# Patient Record
Sex: FEMALE | Race: WHITE | Employment: OTHER | URBAN - METROPOLITAN AREA
[De-identification: names, ages, dates, MRNs, and addresses within clinical notes are randomized per-mention and may not be internally consistent; named-entity substitution may affect disease eponyms.]

---

## 2019-01-24 ENCOUNTER — HOSPITAL ENCOUNTER (OUTPATIENT)
Dept: MAMMOGRAPHY | Age: 69
Discharge: HOME | End: 2019-01-24
Attending: INTERNAL MEDICINE
Payer: MEDICARE

## 2019-01-24 ENCOUNTER — HOSPITAL ENCOUNTER (OUTPATIENT)
Dept: GENERAL RADIOLOGY | Age: 69
Discharge: HOME | End: 2019-01-24
Attending: INTERNAL MEDICINE
Payer: MEDICARE

## 2019-01-24 DIAGNOSIS — M81.0 OSTEOPOROSIS, UNSPECIFIED OSTEOPOROSIS TYPE, UNSPECIFIED PATHOLOGICAL FRACTURE PRESENCE: ICD-10-CM

## 2019-01-24 DIAGNOSIS — M06.00 SERONEGATIVE RHEUMATOID ARTHRITIS (HCC): ICD-10-CM

## 2019-01-24 DIAGNOSIS — R53.83 FATIGUE, UNSPECIFIED TYPE: ICD-10-CM

## 2019-01-24 DIAGNOSIS — Z79.52 LONG TERM SYSTEMIC STEROID USER: ICD-10-CM

## 2019-01-24 DIAGNOSIS — Z79.83 LONG-TERM CURRENT USE OF BISPHOSPHONATE: ICD-10-CM

## 2019-01-24 DIAGNOSIS — Z79.899 LONG TERM CURRENT USE OF IMMUNOSUPPRESSIVE DRUG: ICD-10-CM

## 2019-01-24 DIAGNOSIS — E55.9 HYPOVITAMINOSIS D: ICD-10-CM

## 2019-01-24 DIAGNOSIS — Z79.899 HIGH RISK MEDICATION USE: ICD-10-CM

## 2019-01-24 PROCEDURE — 77080 DXA BONE DENSITY AXIAL: CPT

## 2019-01-24 PROCEDURE — 73130 X-RAY EXAM OF HAND: CPT

## 2019-04-27 ENCOUNTER — HOSPITAL ENCOUNTER (OUTPATIENT)
Dept: MAMMOGRAPHY | Age: 69
Discharge: HOME | End: 2019-04-27
Attending: INTERNAL MEDICINE
Payer: MEDICARE

## 2019-04-27 DIAGNOSIS — Z12.31 ENCOUNTER FOR SCREENING MAMMOGRAM FOR MALIGNANT NEOPLASM OF BREAST: ICD-10-CM

## 2019-04-27 PROCEDURE — 77063 BREAST TOMOSYNTHESIS BI: CPT

## 2021-03-25 ENCOUNTER — TRANSCRIBE ORDER (OUTPATIENT)
Dept: SCHEDULING | Age: 71
End: 2021-03-25

## 2021-03-25 DIAGNOSIS — M81.8 OTHER OSTEOPOROSIS WITHOUT CURRENT PATHOLOGICAL FRACTURE: Primary | ICD-10-CM

## 2021-03-25 DIAGNOSIS — Z12.31 ENCOUNTER FOR SCREENING MAMMOGRAM FOR MALIGNANT NEOPLASM OF BREAST: Primary | ICD-10-CM

## 2021-05-06 ENCOUNTER — HOSPITAL ENCOUNTER (OUTPATIENT)
Dept: MAMMOGRAPHY | Age: 71
Discharge: HOME | End: 2021-05-06
Attending: NURSE PRACTITIONER
Payer: MEDICARE

## 2021-05-06 DIAGNOSIS — Z12.31 ENCOUNTER FOR SCREENING MAMMOGRAM FOR MALIGNANT NEOPLASM OF BREAST: ICD-10-CM

## 2021-05-06 DIAGNOSIS — M81.8 OTHER OSTEOPOROSIS WITHOUT CURRENT PATHOLOGICAL FRACTURE: ICD-10-CM

## 2021-05-06 PROCEDURE — 77080 DXA BONE DENSITY AXIAL: CPT

## 2021-05-06 PROCEDURE — 77067 SCR MAMMO BI INCL CAD: CPT

## 2021-05-07 ENCOUNTER — TRANSCRIBE ORDER (OUTPATIENT)
Dept: SCHEDULING | Age: 71
End: 2021-05-07

## 2021-05-07 DIAGNOSIS — R92.8 ABNORMAL MAMMOGRAM OF RIGHT BREAST: Primary | ICD-10-CM

## 2021-05-12 ENCOUNTER — HOSPITAL ENCOUNTER (OUTPATIENT)
Dept: MAMMOGRAPHY | Age: 71
Discharge: HOME | End: 2021-05-12
Attending: INTERNAL MEDICINE
Payer: MEDICARE

## 2021-05-12 ENCOUNTER — HOSPITAL ENCOUNTER (OUTPATIENT)
Dept: MAMMOGRAPHY | Age: 71
End: 2021-05-12
Attending: INTERNAL MEDICINE
Payer: MEDICARE

## 2021-05-12 DIAGNOSIS — R92.8 ABNORMAL MAMMOGRAM OF RIGHT BREAST: ICD-10-CM

## 2021-05-12 PROCEDURE — 77065 DX MAMMO INCL CAD UNI: CPT

## 2021-10-20 ENCOUNTER — TRANSCRIBE ORDER (OUTPATIENT)
Dept: SCHEDULING | Age: 71
End: 2021-10-20

## 2021-10-20 DIAGNOSIS — R92.8 ABNORMALITY OF RIGHT BREAST ON SCREENING MAMMOGRAM: Primary | ICD-10-CM

## 2021-11-04 ENCOUNTER — HOSPITAL ENCOUNTER (OUTPATIENT)
Dept: MAMMOGRAPHY | Age: 71
Discharge: HOME | End: 2021-11-04
Attending: INTERNAL MEDICINE
Payer: MEDICARE

## 2021-11-04 DIAGNOSIS — R92.8 ABNORMAL MAMMOGRAM OF RIGHT BREAST: ICD-10-CM

## 2021-11-04 DIAGNOSIS — R92.8 ABNORMALITY OF RIGHT BREAST ON SCREENING MAMMOGRAM: ICD-10-CM

## 2021-11-04 PROCEDURE — 77065 DX MAMMO INCL CAD UNI: CPT

## 2022-02-21 ENCOUNTER — TRANSCRIBE ORDER (OUTPATIENT)
Dept: SCHEDULING | Age: 72
End: 2022-02-21

## 2022-02-21 DIAGNOSIS — Z12.31 SCREENING MAMMOGRAM, ENCOUNTER FOR: Primary | ICD-10-CM

## 2022-04-28 ENCOUNTER — TRANSCRIBE ORDER (OUTPATIENT)
Dept: SCHEDULING | Age: 72
End: 2022-04-28

## 2022-04-28 DIAGNOSIS — R92.8 ABNORMAL MAMMOGRAM OF RIGHT BREAST: Primary | ICD-10-CM

## 2022-05-11 ENCOUNTER — HOSPITAL ENCOUNTER (OUTPATIENT)
Dept: MAMMOGRAPHY | Age: 72
Discharge: HOME | End: 2022-05-11
Attending: INTERNAL MEDICINE
Payer: MEDICARE

## 2022-05-11 ENCOUNTER — HOSPITAL ENCOUNTER (OUTPATIENT)
Dept: MAMMOGRAPHY | Age: 72
Discharge: HOME | End: 2022-05-11
Payer: MEDICARE

## 2022-05-11 DIAGNOSIS — R92.8 ABNORMAL MAMMOGRAM OF RIGHT BREAST: ICD-10-CM

## 2022-05-11 PROCEDURE — 77066 DX MAMMO INCL CAD BI: CPT

## 2022-05-24 ENCOUNTER — OFFICE VISIT (OUTPATIENT)
Dept: RHEUMATOLOGY | Age: 72
End: 2022-05-24
Payer: MEDICARE

## 2022-05-24 VITALS
HEART RATE: 98 BPM | SYSTOLIC BLOOD PRESSURE: 125 MMHG | OXYGEN SATURATION: 95 % | WEIGHT: 124.8 LBS | DIASTOLIC BLOOD PRESSURE: 73 MMHG

## 2022-05-24 VITALS
HEART RATE: 95 BPM | SYSTOLIC BLOOD PRESSURE: 132 MMHG | WEIGHT: 124 LBS | DIASTOLIC BLOOD PRESSURE: 77 MMHG | TEMPERATURE: 97.8 F

## 2022-05-24 DIAGNOSIS — Z79.899 LONG TERM CURRENT USE OF IMMUNOSUPPRESSIVE DRUG: ICD-10-CM

## 2022-05-24 DIAGNOSIS — M06.09 RHEUMATOID ARTHRITIS OF MULTIPLE SITES WITHOUT RHEUMATOID FACTOR (HCC): Primary | ICD-10-CM

## 2022-05-24 DIAGNOSIS — M35.01 SJOGREN'S SYNDROME WITH KERATOCONJUNCTIVITIS SICCA (HCC): ICD-10-CM

## 2022-05-24 DIAGNOSIS — Z79.52 LONG TERM (CURRENT) USE OF SYSTEMIC STEROIDS: ICD-10-CM

## 2022-05-24 DIAGNOSIS — M15.9 GENERALIZED OSTEOARTHRITIS: ICD-10-CM

## 2022-05-24 DIAGNOSIS — M81.0 POSTMENOPAUSAL OSTEOPOROSIS: ICD-10-CM

## 2022-05-24 DIAGNOSIS — E55.9 HYPOVITAMINOSIS D: ICD-10-CM

## 2022-05-24 DIAGNOSIS — Z79.899 HIGH RISK MEDICATION USE: ICD-10-CM

## 2022-05-24 DIAGNOSIS — Z79.899 ENCOUNTER FOR LONG-TERM (CURRENT) USE OF HIGH-RISK MEDICATION: ICD-10-CM

## 2022-05-24 DIAGNOSIS — Z79.52 LONG TERM CURRENT USE OF SYSTEMIC STEROIDS: ICD-10-CM

## 2022-05-24 DIAGNOSIS — Z51.81 ENCOUNTER FOR THERAPEUTIC DRUG MONITORING: ICD-10-CM

## 2022-05-24 DIAGNOSIS — R53.82 CHRONIC FATIGUE: ICD-10-CM

## 2022-05-24 PROCEDURE — 3017F COLORECTAL CA SCREEN DOC REV: CPT | Performed by: INTERNAL MEDICINE

## 2022-05-24 PROCEDURE — 1090F PRES/ABSN URINE INCON ASSESS: CPT | Performed by: INTERNAL MEDICINE

## 2022-05-24 PROCEDURE — 1123F ACP DISCUSS/DSCN MKR DOCD: CPT | Performed by: INTERNAL MEDICINE

## 2022-05-24 PROCEDURE — G8399 PT W/DXA RESULTS DOCUMENT: HCPCS | Performed by: INTERNAL MEDICINE

## 2022-05-24 PROCEDURE — 96372 THER/PROPH/DIAG INJ SC/IM: CPT | Performed by: INTERNAL MEDICINE

## 2022-05-24 PROCEDURE — 20600 DRAIN/INJ JOINT/BURSA W/O US: CPT | Performed by: INTERNAL MEDICINE

## 2022-05-24 PROCEDURE — G8421 BMI NOT CALCULATED: HCPCS | Performed by: INTERNAL MEDICINE

## 2022-05-24 PROCEDURE — G8427 DOCREV CUR MEDS BY ELIG CLIN: HCPCS | Performed by: INTERNAL MEDICINE

## 2022-05-24 PROCEDURE — 1036F TOBACCO NON-USER: CPT | Performed by: INTERNAL MEDICINE

## 2022-05-24 PROCEDURE — 99215 OFFICE O/P EST HI 40 MIN: CPT | Performed by: INTERNAL MEDICINE

## 2022-05-24 RX ORDER — NITROGLYCERIN 0.4 MG/1
0.4 TABLET SUBLINGUAL EVERY 5 MIN PRN
COMMUNITY
Start: 2019-12-20

## 2022-05-24 RX ORDER — LEVOTHYROXINE SODIUM 88 UG/1
TABLET ORAL
COMMUNITY
Start: 2022-05-02

## 2022-05-24 RX ORDER — CERTOLIZUMAB PEGOL 200 MG/ML
200 INJECTION, SOLUTION SUBCUTANEOUS
COMMUNITY

## 2022-05-24 RX ADMIN — METHYLPREDNISOLONE ACETATE 80 MG: 80 INJECTION, SUSPENSION INTRA-ARTICULAR; INTRALESIONAL; INTRAMUSCULAR; SOFT TISSUE at 10:49

## 2022-05-24 ASSESSMENT — PATIENT HEALTH QUESTIONNAIRE - PHQ9
2. FEELING DOWN, DEPRESSED OR HOPELESS: 0
SUM OF ALL RESPONSES TO PHQ QUESTIONS 1-9: 5
6. FEELING BAD ABOUT YOURSELF - OR THAT YOU ARE A FAILURE OR HAVE LET YOURSELF OR YOUR FAMILY DOWN: 0
7. TROUBLE CONCENTRATING ON THINGS, SUCH AS READING THE NEWSPAPER OR WATCHING TELEVISION: 0
10. IF YOU CHECKED OFF ANY PROBLEMS, HOW DIFFICULT HAVE THESE PROBLEMS MADE IT FOR YOU TO DO YOUR WORK, TAKE CARE OF THINGS AT HOME, OR GET ALONG WITH OTHER PEOPLE: 1
4. FEELING TIRED OR HAVING LITTLE ENERGY: 3
1. LITTLE INTEREST OR PLEASURE IN DOING THINGS: 1
8. MOVING OR SPEAKING SO SLOWLY THAT OTHER PEOPLE COULD HAVE NOTICED. OR THE OPPOSITE, BEING SO FIGETY OR RESTLESS THAT YOU HAVE BEEN MOVING AROUND A LOT MORE THAN USUAL: 0
3. TROUBLE FALLING OR STAYING ASLEEP: 1
5. POOR APPETITE OR OVEREATING: 0
9. THOUGHTS THAT YOU WOULD BE BETTER OFF DEAD, OR OF HURTING YOURSELF: 0
SUM OF ALL RESPONSES TO PHQ9 QUESTIONS 1 & 2: 1
SUM OF ALL RESPONSES TO PHQ QUESTIONS 1-9: 5

## 2022-05-24 NOTE — PROGRESS NOTES
[address] [address], [address]  Office : [phone], Fax: [phone]         Cimzia injections today. 400  mg lyophilized powder given SC. Each 200 mg vial mixed with 1 ml sterile water each and allowed 30 minutes to reconstitute. Injections given in right and left lower abdomen without difficulty. Last injections were given 4/25//2022  Next Injections scheduled for 6/21/2022    Pre-Infusion Questionnaire  1. Has your insurance changed or have you received a new card since your last visit? No  2. Have you had an infection since your last infusion? No  3. Have you recently had GI problems, open wounds, urinary problems, or chest pain? No  4. Are you currently taking antibiotics? 5. No  6. Have you recently had or are you scheduled for any surgical procedures? No  7. Have you had a TB test in the last year? Yes  8. Did you take an antihistamine today? No  9. Have you received any vaccinations recently? Yes  10. When was your last appointment with one of our providers? Today 5/24/2022  11. When was your last injection? 4/25/2022  12. Are you in a skilled nursing facility?       No    Reviewed and Confirmed by Hiram Rodriguez

## 2022-05-24 NOTE — PATIENT INSTRUCTIONS
1. Labs - cbc, cmp, vit d before next visit    2. Prednisone  5mg daily   as needed   3. Vit d 35727 units  once weekly for now   4.  prolia - every 6 months   5. Gabapentin 300mg once daily at 6pm  6.  cimzia - every 4 weeks shot - continue   7.  R wrist Injected - try not to overuse for next couple of days  8.  RTC in  4 months   - call if any issues

## 2022-05-24 NOTE — PROGRESS NOTES
Subjective:      HPI:        Permanent history    Mrs. Jean Claude Garland is a very pleasant 67year-old  lady with past medical history significant for seronegative rheumatoid arthritis, osteoporosis, tenosynovitis of the wrist, osteoarthritis, hypothyroidism, hyperlipidemia, fibroids who presents for evaluation of seronegative RA. Patient previously seen by rheumatology lost to follow-up I have reviewed these records. In reviewing records it appears patient has had long-standing seronegative rheumatoid arthritis, for which she has been treated with methotrexate, Plaquenil, failed Rituxan, Symphony Olena Share, Actemra and most recently on Cimzia injections. Patient reports Cimzia injections were helping a however due to shingles outbreak was discontinued and was lost to follow-up. Reports pretty much only using for the past year as needed pain meds and NSAIDs for pain flareups. However the past few weeks patient has had increased pain and swelling in the right wrist, seen by PCP, treated with short-term prednisone during which symptoms improve however upon tapering symptoms return. Still persistent today. Symptoms generally worse first thing in the morning with significant stiffness up to an hour a day. Generally improved with movement with rest - Having to wear a brace on a daily basis. In terms of her osteoporosis previously treated with Actonel and has completed 5 years of Reclast last infusion being 2016 may. Since last visit    Not taking any prednisone. Pt has IBS, hard to take Pred or Mtx. Taking vit d 50,000 weekly, Wilmer 300 mg- not taking anymore, Cimzia every 4 weeks. .  Rash on Lt arm- cleared - dx as atopic dermatitis Prolia 1/26/22. BMD 5/6/21. Worst joint thumbs, right foot. ROS:     Musculoskeletal ROS:   .    Abnormal:  joint pain, joint swelling  . AM stiffness (hours): 120 + min  . Pain scale (0-10): 6  .     Fatigue scale (0-10): 8  .    Job status:  not working  . Activities of daily living: much difficulty    positive as above with the addition of   Dry eyes, upset stomach, red eyes, difficulty sleeping, diarrhea  Otherwise negative for:  Fevers, chills or sweats. Weight  stable  No headaches or scalp tenderness. No jaw claudication. No acute visual changes. No auditory complaints. No nasal discharge or rhinorrhea or dry mouth. No oral lesions or ulcers. No sore throat. No tongue pain. No cough. No shortness of breath, dyspnea on exertion or wheezing. No hemoptysis. No chest pains. No palpitations. No lightheadedness. No pedal edema. No GERD symptoms, abdominal pain, or constipation. No increased urinary frequency, nocturia, dysuria or changes in urine color. No alopecia or skin rashes/lesions. No changes in skin tightness. No Raynaud's. Photosensitivity. Current Outpatient Medications:     predniSONE (DELTASONE) 5 mg tablet, Take 15mg daily x3 then 10mg daily x 3 then 5mg daily x 3 then  as needed, Disp: 90 Tablet, Rfl: 0    gabapentin (NEURONTIN) 300 mg capsule, Take 1 Capsule by mouth daily (with dinner). , Disp: 90 Capsule, Rfl: 0    ergocalciferol (ERGOCALCIFEROL) 1,250 mcg (50,000 unit) capsule, Take 1 Capsule by mouth every seven (7) days. , Disp: 12 Capsule, Rfl: 0    cholecalciferol (VITAMIN D3) (2,000 UNITS /50 MCG) cap capsule, Take  by mouth daily. , Disp: , Rfl:     alendronate (FOSAMAX) 70 mg tablet, Take  by mouth every seven (7) days. , Disp: , Rfl:     hydrOXYchloroQUINE (PlaqueniL) 200 mg tablet, Take 1 Tablet by mouth daily. , Disp: 90 Tablet, Rfl: 0    rosuvastatin (CRESTOR) 10 mg tablet, Take 10 mg by mouth daily. , Disp: , Rfl:     aspirin delayed-release 81 mg tablet, Take  by mouth daily. , Disp: , Rfl:     predniSONE (DELTASONE) 5 mg tablet, Take 1 Tab by mouth daily as needed for Pain.  (Patient not taking: Reported on 10/6/2021), Disp: 90 Tab, Rfl: 0    pen needle, diabetic (COMFORT EZ PEN NEEDLES) 32 gauge x 3/16\" ndle, For use daily with the Forteo pen device, Disp: 100 Each, Rfl: 1    levothyroxine (SYNTHROID) 50 mcg tablet, Take  by mouth Daily (before breakfast). , Disp: , Rfl:     sertraline (ZOLOFT) 100 mg tablet, Take 1 Tab by mouth daily. (Patient taking differently: Take 125 mg by mouth daily.), Disp: 90 Tab, Rfl: 1    CALCIUM CARBONATE/VITAMIN D3 (CALCIUM 600 + D,3, PO), Take 1 Tab by mouth daily. Ca 600 D 100, Disp: , Rfl:     CALCIUM CARB-MAG OXIDE-ZINC OX PO, Take 1 Tab by mouth daily. Ca 333, Disp: , Rfl:     multivitamin (ONE A DAY) tablet, Take 1 Tab by mouth daily. Ca 500, D 1000, Disp: , Rfl:     folic acid 075 mcg tablet, Take 800 mcg by mouth daily. , Disp: , Rfl:     Allergies   Allergen Reactions    Egg Itching and Other (comments)     Throat swelling    Erythromycin Rash    Pcn [Penicillins] Rash    Sulfa (Sulfonamide Antibiotics) Swelling     Throat swells       Patient Active Problem List   Diagnosis Code    Fibroids D21.9    Depression F32. A    Mouth dryness R68.2    Eye dryness H04.129    Polyarthritis M13.0    Other tenosynovitis of wrist flexor M65.839    Fatigue R53.83    Hypothyroidism E03.9    Carpal tunnel syndrome G56.00    Hyperlipidemia E78.5    Hand tendonitis M77.8    Sjogren's syndrome (HCC) M35.00    Osteoporosis, post-menopausal M81.0    Trigger middle finger of left hand M65.332    Leukopenia D72.819    Edema R60.9    Primary osteoarthritis of both hands M19.041, M19.042    Rheumatoid arthritis of multiple sites without rheumatoid factor (HCC) M06.09    Chronic coughing R05.3       Past Medical History:   Diagnosis Date    Carpal tunnel syndrome 8/15/2013    Depression 8/15/2013    Eye dryness 8/15/2013    Fatigue 8/15/2013    Fibroids 8/15/2013    BILATERAL BREAST     Fibromyalgia 8/15/2013    Hyperlipidemia 8/15/2013    Hypothyroidism 8/15/2013    Menopause     Mouth dryness 8/15/2013    Osteoarthritis 8/15/2013    Other tenosynovitis of wrist flexor 8/15/2013    Polyarthritis 8/15/2013    RA (rheumatoid arthritis) ([facility] [address] 8/15/2013       Past Surgical History:   Procedure Laterality Date    HX BREAST AUGMENTATION      HX  SECTION      HX HYSTERECTOMY      ONE OVARY REMOVED    HX MASTECTOMY Bilateral     FIBROCYSTIC MASSES REMOVED, HAS TISSUE    HX ORTHOPAEDIC  1995    TENDON RELEASE AT RIGHT ELBOW       Family History   Problem Relation Age of Onset    Alzheimer's Disease Mother     Coronary Art Dis Mother     Bipolar Disorder Mother     Other Father         ANEURYSM    Heart Disease Father     Hypertension Father     Arthritis-rheumatoid Maternal Aunt        Social History     Socioeconomic History    Marital status:    Tobacco Use    Smoking status: Never Smoker    Smokeless tobacco: Never Used   Substance and Sexual Activity    Alcohol use: No         Objective:         Vitals:    22 1123   BP: 125/73   Pulse: 98   SpO2: 95%          PHYSICAL EXAMINATION:     General: alert, healthy, well nourished, pleasant, no acute distress   Lungs: clear to auscultation bilaterally without wheezes,    Heart: regular rate & rhythm, +S1, +S2, no murmurs   Extremities: no clubbing, cyanosis, or edema    MUSCULOSKELETAL:   -Hands: Diffuse fullness chronic thickening bilateral MCP second third fourth, no acute synovitis    -Wrists: Right wrist  tender to palpation decreased range of motion due to pain  - leeroy [facility]   -Elbows: normal   -Shoulders: normal   -Neck: Slow but full range of motion due to pain  -Back: Point tenderness of the low back  -Hips: normal   -Knees: Bilateral crepitus  -Ankles: normal   -Feet: normal    Swollen Joint Count:1  Tender Joint Count: 2 -3         Procedure:   Patient identified, procedure verified, site verified. Risks/benefits discussed. Final verification of procedure. Timeout performed. Application of topical anesthetic and sterile preparation.    injection site: right wristinjected with depomedrol 80 mg and 0.5ml of 1% lidocaine. Patient tolerated well, no complications. Assessment:       Mrs. Jean Claude Garland is a very pleasant 27-year-old  lady with past medical history significant for seronegative rheumatoid arthritis, osteoporosis, tenosynovitis of the wrist, osteoarthritis, hypothyroidism, hyperlipidemia, fibroids who presents for fu of seronegative RA. Extensive review of records from old rheumatology please see summary above, I do not have patient's PCP records available at this time will obtain. Recent xrays which I have reviewed myself show joint space narrowing and some new early erosive changes     Clinical picture consistent with long-standing seronegative rheumatoid arthritis previously failed methotrexate, Plaquenil, rituximab, Simphoni Aria, Gerold Neer, Actemra and most recently on Cimzia which although effective is held due to recurrent shingles outbreak -was on hold with immunosuppressives except low dose plaquenil subsequently flairing significantly and finally able to get shingrex vaccines and resume Cimzia last visit, has been getting monthly injections tolerating well no flaring of shingles. Labs stable -ophthalmology recommend discontinuing Plaquenil  stopped 2021. Flaring of right-sided wrist pain more CMC OA was injected. Osteoporosis -previously treated with Actonel and 5 years of Reclast last dose 2016 May. Repeat dexa scan from 2021 -    FRAX calculation (using patients risk factors ) of 10 yr risk of major osteoporotic and hip fracture is 30% and 9 % (from 48  and 18 % respectively - after completing 2 yrs of forteo) and Tx warranted if >20% and 3 % respectively - change to prolia - every 6 months - tolerating . Radiculopathy -on gabapentin as below. Total visit time  45 minutes , greater than half of the time was spent on counseling. Plan:       1. Labs - cbc, cmp, vit d before next visit    2. Prednisone  5mg daily   as needed   3.  Vit d 00817 units  once weekly for now   4.  prolia - every 6 months   5. Gabapentin 300mg once daily at 6pm  6.  cimzia - every 4 weeks shot - continue   7.  R wrist Injected - try not to overuse for next couple of days  8.  RTC in  4 months   - call if any issues

## 2022-05-25 RX ORDER — PREDNISONE 1 MG/1
5 TABLET ORAL DAILY
Qty: 90 TABLET | Refills: 1 | Status: SHIPPED | OUTPATIENT
Start: 2022-05-25 | End: 2022-09-13

## 2022-05-25 RX ORDER — METHYLPREDNISOLONE ACETATE 80 MG/ML
80 INJECTION, SUSPENSION INTRA-ARTICULAR; INTRALESIONAL; INTRAMUSCULAR; SOFT TISSUE ONCE
Status: COMPLETED | OUTPATIENT
Start: 2022-05-25 | End: 2022-05-24

## 2022-05-25 RX ORDER — GABAPENTIN 300 MG/1
300 CAPSULE ORAL
Qty: 90 CAPSULE | Refills: 1 | Status: SHIPPED | OUTPATIENT
Start: 2022-05-25 | End: 2022-10-11

## 2022-05-25 RX ORDER — ERGOCALCIFEROL (VITAMIN D2) 1250 MCG
50000 CAPSULE ORAL
Qty: 12 CAPSULE | Refills: 1 | Status: SHIPPED | OUTPATIENT
Start: 2022-05-25 | End: 2022-09-13 | Stop reason: SDUPTHER

## 2022-06-21 ENCOUNTER — NURSE ONLY (OUTPATIENT)
Dept: RHEUMATOLOGY | Age: 72
End: 2022-06-21

## 2022-06-21 VITALS
SYSTOLIC BLOOD PRESSURE: 122 MMHG | HEART RATE: 97 BPM | WEIGHT: 124 LBS | DIASTOLIC BLOOD PRESSURE: 73 MMHG | TEMPERATURE: 97.8 F

## 2022-06-21 DIAGNOSIS — M06.09 RHEUMATOID ARTHRITIS OF MULTIPLE SITES WITHOUT RHEUMATOID FACTOR (HCC): Primary | ICD-10-CM

## 2022-06-21 NOTE — PROGRESS NOTES
[address] [address], [address]  Office : [phone], Fax: [phone]         Cimzia injections today. 400  mg lyophilized powder given SC. Each 200 mg vial mixed with 1 ml sterile water each and allowed 30 minutes to reconstitute. Injections given in right and left abd without difficulty. Last injections were given 5/24/2022  Next Injections scheduled for 7/19/2022    Pre-Infusion Questionnaire  1. Has your insurance changed or have you received a new card since your last visit? No  2. Have you had an infection since your last infusion? No  3. Have you recently had GI problems, open wounds, urinary problems, or chest pain? No  4. Are you currently taking antibiotics? 5. No  6. Have you recently had or are you scheduled for any surgical procedures? No  7. Have you had a TB test in the last year? Yes  8. Did you take an antihistamine today? No  9. Have you received any vaccinations recently? Yes  10. When was your last appointment with one of our providers? 5/24/2022  11. When was your last injection? 5/24/2022  12. Are you in a skilled nursing facility?       No    Reviewed and Confirmed by Ludy Church

## 2022-07-19 ENCOUNTER — NURSE ONLY (OUTPATIENT)
Dept: RHEUMATOLOGY | Age: 72
End: 2022-07-19
Payer: MEDICARE

## 2022-07-19 VITALS — SYSTOLIC BLOOD PRESSURE: 125 MMHG | TEMPERATURE: 98.2 F | DIASTOLIC BLOOD PRESSURE: 69 MMHG | HEART RATE: 90 BPM

## 2022-07-19 DIAGNOSIS — M06.09 RHEUMATOID ARTHRITIS OF MULTIPLE SITES WITHOUT RHEUMATOID FACTOR (HCC): Primary | ICD-10-CM

## 2022-07-19 PROCEDURE — 96372 THER/PROPH/DIAG INJ SC/IM: CPT | Performed by: INTERNAL MEDICINE

## 2022-07-19 NOTE — PROGRESS NOTES
[address] [address], [address]  Office : [phone], Fax: [phone]         Cimzia injections today. 400 mg lyophilized powder given SC. Each 200 mg vial mixed with 1 ml sterile water each and allowed 30 minutes to reconstitute. Injections given in bilateral lower abdomen subQ without difficulty. Last injections were given 6/21/22  Next Injections scheduled for 8/16/22    Pre-Infusion Questionnaire  Has your insurance changed or have you received a new card since your last visit? No  Have you had an infection since your last infusion? No  Have you recently had GI problems, open wounds, urinary problems, or chest pain? No  Are you currently taking antibiotics? No  Have you recently had or are you scheduled for any surgical procedures? No  Have you had a TB test in the last year? Yes  Did you take an antihistamine today? No  Have you received any vaccinations recently? No  When was your last appointment with one of our providers? 5/24/22  When was your last injection? 6/21/22  11. Are you in a skilled nursing facility?       No    Reviewed and Confirmed by Sudeep Hernandezelor

## 2022-07-27 ENCOUNTER — NURSE ONLY (OUTPATIENT)
Dept: RHEUMATOLOGY | Age: 72
End: 2022-07-27
Payer: MEDICARE

## 2022-07-27 VITALS — DIASTOLIC BLOOD PRESSURE: 60 MMHG | HEART RATE: 84 BPM | TEMPERATURE: 97.9 F | SYSTOLIC BLOOD PRESSURE: 122 MMHG

## 2022-07-27 DIAGNOSIS — M81.0 POSTMENOPAUSAL OSTEOPOROSIS: Primary | ICD-10-CM

## 2022-07-27 PROCEDURE — 96372 THER/PROPH/DIAG INJ SC/IM: CPT | Performed by: INTERNAL MEDICINE

## 2022-07-27 NOTE — PROGRESS NOTES
[address] [address], [address]  Office : [phone], Fax: [phone]       # 3 Prolia 60mg/ml injected SC today into left  arm. Patient tolerated injection well. Advised patient to continue with calcium and vitamin D post injection. Advised patient to call with any problems post injection. Medication ordered by Dr. Eli Douglas. Pre-infusion/injection questionairre for osteoporosis    1. Have you had or are you planning any dental work?  no    2. Are you taking your calcium and vitamin D? yes    3. When was your last osteoporosis treatment? 1/26/2022    4. Have you had any recent fractures?    No

## 2022-08-16 ENCOUNTER — NURSE ONLY (OUTPATIENT)
Dept: RHEUMATOLOGY | Age: 72
End: 2022-08-16
Payer: MEDICARE

## 2022-08-16 VITALS
TEMPERATURE: 97.8 F | DIASTOLIC BLOOD PRESSURE: 79 MMHG | WEIGHT: 123 LBS | HEART RATE: 91 BPM | SYSTOLIC BLOOD PRESSURE: 132 MMHG

## 2022-08-16 DIAGNOSIS — M06.09 RHEUMATOID ARTHRITIS OF MULTIPLE SITES WITHOUT RHEUMATOID FACTOR (HCC): Primary | ICD-10-CM

## 2022-08-16 DIAGNOSIS — Z51.81 ENCOUNTER FOR THERAPEUTIC DRUG MONITORING: ICD-10-CM

## 2022-08-16 DIAGNOSIS — Z79.899 LONG TERM CURRENT USE OF IMMUNOSUPPRESSIVE DRUG: ICD-10-CM

## 2022-08-16 DIAGNOSIS — Z79.52 LONG TERM (CURRENT) USE OF SYSTEMIC STEROIDS: ICD-10-CM

## 2022-08-16 DIAGNOSIS — Z79.899 HIGH RISK MEDICATION USE: ICD-10-CM

## 2022-08-16 PROCEDURE — 96372 THER/PROPH/DIAG INJ SC/IM: CPT | Performed by: INTERNAL MEDICINE

## 2022-08-16 NOTE — PROGRESS NOTES
[address] [address], [address]  Office : [phone], Fax: [phone]         Cimzia injections today. 400 mg lyophilized powder given SC. Each 200 mg vial mixed with 1 ml sterile water each and allowed 30 minutes to reconstitute. Injections given in abdomen without difficulty. Last injections were given 7/19/2022  Next Injections scheduled for 9/13/2022    Pre-Infusion Questionnaire  Has your insurance changed or have you received a new card since your last visit? No  Have you had an infection since your last infusion? No  Have you recently had GI problems, open wounds, urinary problems, or chest pain? No  Are you currently taking antibiotics? No  Have you recently had or are you scheduled for any surgical procedures? Yes    cataracts in the near future  Have you had a TB test in the last year? Yes  will be tested 9/6/2022 with other labs drawn  Did you take an antihistamine today? No  Have you received any vaccinations recently? Yes  When was your last appointment with one of our providers? 5/24/2022  When was your last injection? 7/19/2022  12. Are you in a skilled nursing facility?       No    Reviewed and Confirmed by Viki Tran

## 2022-09-06 ENCOUNTER — NURSE ONLY (OUTPATIENT)
Dept: RHEUMATOLOGY | Age: 72
End: 2022-09-06

## 2022-09-06 DIAGNOSIS — Z79.899 ENCOUNTER FOR LONG-TERM (CURRENT) USE OF HIGH-RISK MEDICATION: ICD-10-CM

## 2022-09-06 DIAGNOSIS — R53.82 CHRONIC FATIGUE: ICD-10-CM

## 2022-09-06 DIAGNOSIS — Z51.81 ENCOUNTER FOR THERAPEUTIC DRUG MONITORING: ICD-10-CM

## 2022-09-06 DIAGNOSIS — Z79.52 LONG TERM CURRENT USE OF SYSTEMIC STEROIDS: ICD-10-CM

## 2022-09-06 DIAGNOSIS — Z79.899 HIGH RISK MEDICATION USE: ICD-10-CM

## 2022-09-06 DIAGNOSIS — Z79.899 LONG TERM CURRENT USE OF IMMUNOSUPPRESSIVE DRUG: ICD-10-CM

## 2022-09-06 DIAGNOSIS — M06.09 RHEUMATOID ARTHRITIS OF MULTIPLE SITES WITHOUT RHEUMATOID FACTOR (HCC): ICD-10-CM

## 2022-09-06 DIAGNOSIS — Z79.52 LONG TERM (CURRENT) USE OF SYSTEMIC STEROIDS: ICD-10-CM

## 2022-09-06 DIAGNOSIS — E55.9 HYPOVITAMINOSIS D: ICD-10-CM

## 2022-09-06 DIAGNOSIS — M35.01 SJOGREN'S SYNDROME WITH KERATOCONJUNCTIVITIS SICCA (HCC): ICD-10-CM

## 2022-09-06 DIAGNOSIS — M15.9 GENERALIZED OSTEOARTHRITIS: ICD-10-CM

## 2022-09-06 DIAGNOSIS — M81.0 POSTMENOPAUSAL OSTEOPOROSIS: ICD-10-CM

## 2022-09-06 LAB
25(OH)D3 SERPL-MCNC: 90.7 NG/ML (ref 30–100)
ALBUMIN SERPL-MCNC: 3.9 G/DL (ref 3.2–4.6)
ALBUMIN/GLOB SERPL: 1.1 {RATIO} (ref 1.2–3.5)
ALP SERPL-CCNC: 43 U/L (ref 50–136)
ALT SERPL-CCNC: 29 U/L (ref 12–65)
ANION GAP SERPL CALC-SCNC: 3 MMOL/L (ref 4–13)
AST SERPL-CCNC: 23 U/L (ref 15–37)
BASOPHILS # BLD: 0 K/UL (ref 0–0.2)
BASOPHILS NFR BLD: 1 % (ref 0–2)
BILIRUB SERPL-MCNC: 1.2 MG/DL (ref 0.2–1.1)
BUN SERPL-MCNC: 10 MG/DL (ref 8–23)
CALCIUM SERPL-MCNC: 8.9 MG/DL (ref 8.3–10.4)
CHLORIDE SERPL-SCNC: 102 MMOL/L (ref 101–110)
CO2 SERPL-SCNC: 27 MMOL/L (ref 21–32)
CREAT SERPL-MCNC: 0.8 MG/DL (ref 0.6–1)
DIFFERENTIAL METHOD BLD: NORMAL
EOSINOPHIL # BLD: 0.2 K/UL (ref 0–0.8)
EOSINOPHIL NFR BLD: 3 % (ref 0.5–7.8)
ERYTHROCYTE [DISTWIDTH] IN BLOOD BY AUTOMATED COUNT: 11.9 % (ref 11.9–14.6)
GLOBULIN SER CALC-MCNC: 3.7 G/DL (ref 2.3–3.5)
GLUCOSE SERPL-MCNC: 96 MG/DL (ref 65–100)
HCT VFR BLD AUTO: 42.1 % (ref 35.8–46.3)
HGB BLD-MCNC: 14.2 G/DL (ref 11.7–15.4)
IMM GRANULOCYTES # BLD AUTO: 0 K/UL (ref 0–0.5)
IMM GRANULOCYTES NFR BLD AUTO: 0 % (ref 0–5)
LYMPHOCYTES # BLD: 2.1 K/UL (ref 0.5–4.6)
LYMPHOCYTES NFR BLD: 34 % (ref 13–44)
MCH RBC QN AUTO: 32.1 PG (ref 26.1–32.9)
MCHC RBC AUTO-ENTMCNC: 33.7 G/DL (ref 31.4–35)
MCV RBC AUTO: 95 FL (ref 79.6–97.8)
MONOCYTES # BLD: 0.5 K/UL (ref 0.1–1.3)
MONOCYTES NFR BLD: 9 % (ref 4–12)
NEUTS SEG # BLD: 3.3 K/UL (ref 1.7–8.2)
NEUTS SEG NFR BLD: 53 % (ref 43–78)
NRBC # BLD: 0 K/UL (ref 0–0.2)
PLATELET # BLD AUTO: 166 K/UL (ref 150–450)
PMV BLD AUTO: 9.9 FL (ref 9.4–12.3)
POTASSIUM SERPL-SCNC: 4.4 MMOL/L (ref 3.5–5.1)
PROT SERPL-MCNC: 7.6 G/DL (ref 6.3–8.2)
RBC # BLD AUTO: 4.43 M/UL (ref 4.05–5.2)
SODIUM SERPL-SCNC: 132 MMOL/L (ref 136–145)
WBC # BLD AUTO: 6.1 K/UL (ref 4.3–11.1)

## 2022-09-09 LAB
M TB IFN-G BLD-IMP: NEGATIVE
QUANTIFERON CRITERIA: NORMAL
QUANTIFERON MITOGEN VALUE: >10 IU/ML
QUANTIFERON NIL VALUE: 0.02 IU/ML
QUANTIFERON TB1 AG: 0.11 IU/ML
QUANTIFERON TB2 AG: 0.16 IU/ML
QUANTIFERON, INCUBATION: NORMAL

## 2022-09-13 ENCOUNTER — OFFICE VISIT (OUTPATIENT)
Dept: RHEUMATOLOGY | Age: 72
End: 2022-09-13
Payer: MEDICARE

## 2022-09-13 ENCOUNTER — NURSE ONLY (OUTPATIENT)
Dept: RHEUMATOLOGY | Age: 72
End: 2022-09-13
Payer: MEDICARE

## 2022-09-13 VITALS — SYSTOLIC BLOOD PRESSURE: 129 MMHG | DIASTOLIC BLOOD PRESSURE: 80 MMHG | TEMPERATURE: 97.9 F | HEART RATE: 90 BPM

## 2022-09-13 VITALS — WEIGHT: 123 LBS | HEART RATE: 109 BPM | DIASTOLIC BLOOD PRESSURE: 81 MMHG | SYSTOLIC BLOOD PRESSURE: 126 MMHG

## 2022-09-13 DIAGNOSIS — Z79.83 LONG TERM (CURRENT) USE OF BISPHOSPHONATES: ICD-10-CM

## 2022-09-13 DIAGNOSIS — M06.09 RHEUMATOID ARTHRITIS OF MULTIPLE SITES WITHOUT RHEUMATOID FACTOR (HCC): Primary | ICD-10-CM

## 2022-09-13 DIAGNOSIS — Z51.81 ENCOUNTER FOR THERAPEUTIC DRUG MONITORING: ICD-10-CM

## 2022-09-13 DIAGNOSIS — Z79.52 LONG TERM (CURRENT) USE OF SYSTEMIC STEROIDS: ICD-10-CM

## 2022-09-13 DIAGNOSIS — M35.01 SJOGREN'S SYNDROME WITH KERATOCONJUNCTIVITIS SICCA (HCC): ICD-10-CM

## 2022-09-13 DIAGNOSIS — Z79.899 HIGH RISK MEDICATION USE: ICD-10-CM

## 2022-09-13 DIAGNOSIS — Z79.899 LONG TERM CURRENT USE OF IMMUNOSUPPRESSIVE DRUG: ICD-10-CM

## 2022-09-13 DIAGNOSIS — M81.0 POSTMENOPAUSAL OSTEOPOROSIS: ICD-10-CM

## 2022-09-13 DIAGNOSIS — Z79.52 LONG TERM CURRENT USE OF SYSTEMIC STEROIDS: ICD-10-CM

## 2022-09-13 DIAGNOSIS — E55.9 HYPOVITAMINOSIS D: ICD-10-CM

## 2022-09-13 PROCEDURE — 99214 OFFICE O/P EST MOD 30 MIN: CPT | Performed by: INTERNAL MEDICINE

## 2022-09-13 PROCEDURE — 96372 THER/PROPH/DIAG INJ SC/IM: CPT | Performed by: INTERNAL MEDICINE

## 2022-09-13 PROCEDURE — 1123F ACP DISCUSS/DSCN MKR DOCD: CPT | Performed by: INTERNAL MEDICINE

## 2022-09-13 PROCEDURE — 3017F COLORECTAL CA SCREEN DOC REV: CPT | Performed by: INTERNAL MEDICINE

## 2022-09-13 PROCEDURE — G8427 DOCREV CUR MEDS BY ELIG CLIN: HCPCS | Performed by: INTERNAL MEDICINE

## 2022-09-13 PROCEDURE — 20605 DRAIN/INJ JOINT/BURSA W/O US: CPT | Performed by: INTERNAL MEDICINE

## 2022-09-13 PROCEDURE — G8421 BMI NOT CALCULATED: HCPCS | Performed by: INTERNAL MEDICINE

## 2022-09-13 PROCEDURE — 1036F TOBACCO NON-USER: CPT | Performed by: INTERNAL MEDICINE

## 2022-09-13 PROCEDURE — G8399 PT W/DXA RESULTS DOCUMENT: HCPCS | Performed by: INTERNAL MEDICINE

## 2022-09-13 PROCEDURE — 1090F PRES/ABSN URINE INCON ASSESS: CPT | Performed by: INTERNAL MEDICINE

## 2022-09-13 RX ORDER — ERGOCALCIFEROL (VITAMIN D2) 1250 MCG
50000 CAPSULE ORAL
Qty: 6 CAPSULE | Refills: 2 | Status: SHIPPED | OUTPATIENT
Start: 2022-09-13

## 2022-09-13 RX ORDER — METHYLPREDNISOLONE ACETATE 80 MG/ML
80 INJECTION, SUSPENSION INTRA-ARTICULAR; INTRALESIONAL; INTRAMUSCULAR; SOFT TISSUE ONCE
Status: COMPLETED | OUTPATIENT
Start: 2022-09-13 | End: 2022-09-13

## 2022-09-13 RX ADMIN — METHYLPREDNISOLONE ACETATE 80 MG: 80 INJECTION, SUSPENSION INTRA-ARTICULAR; INTRALESIONAL; INTRAMUSCULAR; SOFT TISSUE at 16:02

## 2022-09-13 NOTE — PROGRESS NOTES
Subjective:      HPI:        Permanent history    Mrs. John Jaimes is a very pleasant 67year-old  lady with past medical history significant for seronegative rheumatoid arthritis, osteoporosis, tenosynovitis of the wrist, osteoarthritis, hypothyroidism, hyperlipidemia, fibroids who presents for evaluation of seronegative RA. Patient previously seen by rheumatology lost to follow-up I have reviewed these records. In reviewing records it appears patient has had long-standing seronegative rheumatoid arthritis, for which she has been treated with methotrexate, Plaquenil, failed Rituxan, Symphony Cesar Mylar, Actemra and most recently on Cimzia injections. Patient reports Cimzia injections were helping a however due to shingles outbreak was discontinued and was lost to follow-up. Reports pretty much only using for the past year as needed pain meds and NSAIDs for pain flareups. However the past few weeks patient has had increased pain and swelling in the right wrist, seen by PCP, treated with short-term prednisone during which symptoms improve however upon tapering symptoms return. Still persistent today. Symptoms generally worse first thing in the morning with significant stiffness up to an hour a day. Generally improved with movement with rest - Having to wear a brace on a daily basis. In terms of her osteoporosis previously treated with Actonel and has completed 5 years of Reclast last infusion being 2016 may. Since last visit   Taking vit d 50,000 weekly,Cimzia every 4 weeks, Wilmer and pred as needed,not currently. Pt had Lt eye cataract sx 8/31/22 and is having Rt eye done in 2 weeks. Labs in epic. Worst pain hands. Especially the left wrist, right much improved after injection last visit    ROS:     Musculoskeletal ROS:   .    Abnormal:  joint pain, joint swelling  . AM stiffness (hours):60min  . Pain scale (0-10): 3  .     Fatigue scale (0-10): 5  .    Job status:  not working  . Activities of daily living: much difficulty    positive as above with the addition of   Dry eyes, upset stomach, red eyes, difficulty sleeping, diarrhea  Otherwise negative for:  Fevers, chills or sweats. Weight  stable  No headaches or scalp tenderness. No jaw claudication. No acute visual changes. No auditory complaints. No nasal discharge or rhinorrhea or dry mouth. No oral lesions or ulcers. No sore throat. No tongue pain. No cough. No shortness of breath, dyspnea on exertion or wheezing. No hemoptysis. No chest pains. No palpitations. No lightheadedness. No pedal edema. No GERD symptoms, abdominal pain, or constipation. No increased urinary frequency, nocturia, dysuria or changes in urine color. No alopecia or skin rashes/lesions. No changes in skin tightness. No Raynaud's. Photosensitivity. Current Outpatient Medications:     predniSONE (DELTASONE) 5 mg tablet, Take 15mg daily x3 then 10mg daily x 3 then 5mg daily x 3 then  as needed, Disp: 90 Tablet, Rfl: 0    gabapentin (NEURONTIN) 300 mg capsule, Take 1 Capsule by mouth daily (with dinner). , Disp: 90 Capsule, Rfl: 0    ergocalciferol (ERGOCALCIFEROL) 1,250 mcg (50,000 unit) capsule, Take 1 Capsule by mouth every seven (7) days. , Disp: 12 Capsule, Rfl: 0    cholecalciferol (VITAMIN D3) (2,000 UNITS /50 MCG) cap capsule, Take  by mouth daily. , Disp: , Rfl:     alendronate (FOSAMAX) 70 mg tablet, Take  by mouth every seven (7) days. , Disp: , Rfl:     hydrOXYchloroQUINE (PlaqueniL) 200 mg tablet, Take 1 Tablet by mouth daily. , Disp: 90 Tablet, Rfl: 0    rosuvastatin (CRESTOR) 10 mg tablet, Take 10 mg by mouth daily. , Disp: , Rfl:     aspirin delayed-release 81 mg tablet, Take  by mouth daily. , Disp: , Rfl:     predniSONE (DELTASONE) 5 mg tablet, Take 1 Tab by mouth daily as needed for Pain.  (Patient not taking: Reported on 10/6/2021), Disp: 90 Tab, Rfl: 0    pen needle, diabetic (COMFORT EZ PEN NEEDLES) 32 gauge x 3/16\" ndjag, For use daily with the Forteo pen device, Disp: 100 Each, Rfl: 1    levothyroxine (SYNTHROID) 50 mcg tablet, Take  by mouth Daily (before breakfast). , Disp: , Rfl:     sertraline (ZOLOFT) 100 mg tablet, Take 1 Tab by mouth daily. (Patient taking differently: Take 125 mg by mouth daily.), Disp: 90 Tab, Rfl: 1    CALCIUM CARBONATE/VITAMIN D3 (CALCIUM 600 + D,3, PO), Take 1 Tab by mouth daily. Ca 600 D 100, Disp: , Rfl:     CALCIUM CARB-MAG OXIDE-ZINC OX PO, Take 1 Tab by mouth daily. Ca 333, Disp: , Rfl:     multivitamin (ONE A DAY) tablet, Take 1 Tab by mouth daily. Ca 500, D 1000, Disp: , Rfl:     folic acid 438 mcg tablet, Take 800 mcg by mouth daily. , Disp: , Rfl:     Allergies   Allergen Reactions    Egg Itching and Other (comments)     Throat swelling    Erythromycin Rash    Pcn [Penicillins] Rash    Sulfa (Sulfonamide Antibiotics) Swelling     Throat swells       Patient Active Problem List   Diagnosis Code    Fibroids D21.9    Depression F32. A    Mouth dryness R68.2    Eye dryness H04.129    Polyarthritis M13.0    Other tenosynovitis of wrist flexor M65.839    Fatigue R53.83    Hypothyroidism E03.9    Carpal tunnel syndrome G56.00    Hyperlipidemia E78.5    Hand tendonitis M77.8    Sjogren's syndrome (HCC) M35.00    Osteoporosis, post-menopausal M81.0    Trigger middle finger of left hand M65.332    Leukopenia D72.819    Edema R60.9    Primary osteoarthritis of both hands M19.041, M19.042    Rheumatoid arthritis of multiple sites without rheumatoid factor (HCC) M06.09    Chronic coughing R05.3       Past Medical History:   Diagnosis Date    Carpal tunnel syndrome 8/15/2013    Depression 8/15/2013    Eye dryness 8/15/2013    Fatigue 8/15/2013    Fibroids 8/15/2013    BILATERAL BREAST     Fibromyalgia 8/15/2013    Hyperlipidemia 8/15/2013    Hypothyroidism 8/15/2013    Menopause     Mouth dryness 8/15/2013    Osteoarthritis 8/15/2013    Other tenosynovitis of wrist flexor 8/15/2013    Polyarthritis 8/15/2013 RA (rheumatoid arthritis) ([facility] 8/15/2013       Past Surgical History:   Procedure Laterality Date    HX BREAST AUGMENTATION      HX  SECTION      HX HYSTERECTOMY      ONE OVARY REMOVED    HX MASTECTOMY Bilateral     FIBROCYSTIC MASSES REMOVED, HAS TISSUE    HX ORTHOPAEDIC  1995    TENDON RELEASE AT RIGHT ELBOW       Family History   Problem Relation Age of Onset    Alzheimer's Disease Mother     Coronary Art Dis Mother     Bipolar Disorder Mother     Other Father         ANEURYSM    Heart Disease Father     Hypertension Father     Arthritis-rheumatoid Maternal Aunt        Social History     Socioeconomic History    Marital status:    Tobacco Use    Smoking status: Never Smoker    Smokeless tobacco: Never Used   Substance and Sexual Activity    Alcohol use: No         Objective:            Vitals:    22 1132   BP: 126/81   Pulse: (!) 109   Weight: 123 lb (55.8 kg)         PHYSICAL EXAMINATION:     General: alert, healthy, well nourished, pleasant, no acute distress   Lungs: clear to auscultation bilaterally without wheezes,    Heart: regular rate & rhythm, +S1, +S2, no murmurs   Extremities: no clubbing, cyanosis, or edema    MUSCULOSKELETAL:   -Hands: Diffuse fullness chronic thickening bilateral MCP second third fourth, no acute synovitis    -Wrists: Right 10 minimally tender to palpation left significantly tender to palpation decreased pincer   -Elbows: normal   -Shoulders: normal   -Neck: Slow but full range of motion due to pain  -Back: Point tenderness of the low back  -Hips: normal   -Knees: Bilateral crepitus  -Ankles: normal   -Feet: normal    Swollen Joint Count: 0  Tender Joint Count: 3         Procedure:   Patient identified, procedure verified, site verified. Risks/benefits discussed. Final verification of procedure. Timeout performed. Application of topical anesthetic and sterile preparation.    injection site: Left wristinjected with depomedrol 80 mg and 0.5ml of 1% lidocaine. Patient tolerated well, no complications. Assessment:       Mrs. Poonam Keyes is a very pleasant 70-year-old  lady with past medical history significant for seronegative rheumatoid arthritis, osteoporosis, tenosynovitis of the wrist, osteoarthritis, hypothyroidism, hyperlipidemia, fibroids who presents for fu of seronegative RA. Extensive review of records from old rheumatology please see summary above, I do not have patient's PCP records available at this time will obtain. Recent xrays which I have reviewed myself show joint space narrowing and some new early erosive changes     Clinical picture consistent with long-standing seronegative rheumatoid arthritis previously failed methotrexate, Plaquenil, rituximab, Simphoni Aria, Anyi Duffel, Actemra and most recently on Cimzia which although effective is held due to recurrent shingles outbreak -was on hold with immunosuppressives except low dose plaquenil subsequently flairing significantly and finally able to get shingrex vaccines and resume Cimzia last visit, has been getting monthly injections tolerating well no flaring of shingles. Labs stable -ophthalmology recommend discontinuing Plaquenil  stopped 2021. Recently had cataracts removed right eye pending in few weeks. In terms of RA fairly stable continue no flaring of her shingles. Left wrist significantly tender to palpation more OA was injected. Osteoporosis -previously treated with Actonel and 5 years of Reclast last dose 2016 May. Repeat dexa scan from 2021 -    FRAX calculation (using patients risk factors ) of 10 yr risk of major osteoporotic and hip fracture is 30% and 9 % (from 48  and 18 % respectively - after completing 2 yrs of forteo) and Tx warranted if >20% and 3 % respectively - change to prolia - every 6 months - tolerating . Radiculopathy -on gabapentin as below. Total visit time 35 minutes , greater than half of the time was spent on counseling.       Plan: 1. Labs - cbc, cmp, vit d before next visit    2. Prednisone  5mg daily   as needed   3. Vit d 67263 units change to every 2 weekly for now   4.  prolia - every 6 months   5. Gabapentin 300mg once daily at 6pm  6.  cimzia - every 4 weeks shot - continue   7. L wrist Injected - try not to overuse for next couple of days  8.   RTC in  4 months   - call if any issues

## 2022-10-11 ENCOUNTER — OFFICE VISIT (OUTPATIENT)
Dept: RHEUMATOLOGY | Age: 72
End: 2022-10-11
Payer: MEDICARE

## 2022-10-11 ENCOUNTER — HOSPITAL ENCOUNTER (OUTPATIENT)
Dept: ULTRASOUND IMAGING | Age: 72
Discharge: HOME | End: 2022-10-14

## 2022-10-11 ENCOUNTER — NURSE ONLY (OUTPATIENT)
Dept: RHEUMATOLOGY | Age: 72
End: 2022-10-11
Payer: MEDICARE

## 2022-10-11 VITALS — SYSTOLIC BLOOD PRESSURE: 121 MMHG | DIASTOLIC BLOOD PRESSURE: 76 MMHG | HEART RATE: 76 BPM | TEMPERATURE: 97.7 F

## 2022-10-11 VITALS — SYSTOLIC BLOOD PRESSURE: 121 MMHG | DIASTOLIC BLOOD PRESSURE: 76 MMHG | HEART RATE: 76 BPM | WEIGHT: 123 LBS

## 2022-10-11 DIAGNOSIS — Z79.52 LONG TERM (CURRENT) USE OF SYSTEMIC STEROIDS: ICD-10-CM

## 2022-10-11 DIAGNOSIS — R22.1 NODULE OF NECK: Primary | ICD-10-CM

## 2022-10-11 DIAGNOSIS — Z51.81 ENCOUNTER FOR THERAPEUTIC DRUG MONITORING: ICD-10-CM

## 2022-10-11 DIAGNOSIS — E55.9 HYPOVITAMINOSIS D: ICD-10-CM

## 2022-10-11 DIAGNOSIS — Z79.899 LONG TERM CURRENT USE OF IMMUNOSUPPRESSIVE DRUG: ICD-10-CM

## 2022-10-11 DIAGNOSIS — R22.1 NECK MASS: ICD-10-CM

## 2022-10-11 DIAGNOSIS — Z79.899 HIGH RISK MEDICATION USE: ICD-10-CM

## 2022-10-11 DIAGNOSIS — R22.1 NODULE OF NECK: ICD-10-CM

## 2022-10-11 DIAGNOSIS — M81.0 POSTMENOPAUSAL OSTEOPOROSIS: ICD-10-CM

## 2022-10-11 DIAGNOSIS — M06.09 RHEUMATOID ARTHRITIS OF MULTIPLE SITES WITHOUT RHEUMATOID FACTOR (HCC): ICD-10-CM

## 2022-10-11 DIAGNOSIS — M06.09 RHEUMATOID ARTHRITIS OF MULTIPLE SITES WITHOUT RHEUMATOID FACTOR (HCC): Primary | ICD-10-CM

## 2022-10-11 DIAGNOSIS — Z79.52 LONG TERM CURRENT USE OF SYSTEMIC STEROIDS: ICD-10-CM

## 2022-10-11 DIAGNOSIS — M35.01 SJOGREN'S SYNDROME WITH KERATOCONJUNCTIVITIS SICCA (HCC): ICD-10-CM

## 2022-10-11 DIAGNOSIS — Z79.899 ENCOUNTER FOR LONG-TERM (CURRENT) USE OF HIGH-RISK MEDICATION: ICD-10-CM

## 2022-10-11 DIAGNOSIS — Z79.83 LONG TERM (CURRENT) USE OF BISPHOSPHONATES: ICD-10-CM

## 2022-10-11 PROCEDURE — 99215 OFFICE O/P EST HI 40 MIN: CPT | Performed by: INTERNAL MEDICINE

## 2022-10-11 PROCEDURE — G8421 BMI NOT CALCULATED: HCPCS | Performed by: INTERNAL MEDICINE

## 2022-10-11 PROCEDURE — 96372 THER/PROPH/DIAG INJ SC/IM: CPT | Performed by: INTERNAL MEDICINE

## 2022-10-11 PROCEDURE — 1036F TOBACCO NON-USER: CPT | Performed by: INTERNAL MEDICINE

## 2022-10-11 PROCEDURE — 1123F ACP DISCUSS/DSCN MKR DOCD: CPT | Performed by: INTERNAL MEDICINE

## 2022-10-11 PROCEDURE — G8484 FLU IMMUNIZE NO ADMIN: HCPCS | Performed by: INTERNAL MEDICINE

## 2022-10-11 PROCEDURE — 1090F PRES/ABSN URINE INCON ASSESS: CPT | Performed by: INTERNAL MEDICINE

## 2022-10-11 PROCEDURE — G8399 PT W/DXA RESULTS DOCUMENT: HCPCS | Performed by: INTERNAL MEDICINE

## 2022-10-11 PROCEDURE — G8427 DOCREV CUR MEDS BY ELIG CLIN: HCPCS | Performed by: INTERNAL MEDICINE

## 2022-10-11 PROCEDURE — 3017F COLORECTAL CA SCREEN DOC REV: CPT | Performed by: INTERNAL MEDICINE

## 2022-10-11 NOTE — PROGRESS NOTES
[address] [address], [address]  Office : [phone], Fax: [phone]         Cimzia injections today. 400 mg lyophilized powder given SC. Each 200 mg vial mixed with 1 ml sterile water each and allowed 30 minutes to reconstitute. Injections given in bilateral lower abdomen without difficulty. Last injections were given 9/13/22  Next Injections scheduled for 9/13/22    Pre-Infusion Questionnaire  Has your insurance changed or have you received a new card since your last visit? No  Have you had an infection since your last infusion? No  Have you recently had GI problems, open wounds, urinary problems, or chest pain? No  Are you currently taking antibiotics? No  Have you recently had or are you scheduled for any surgical procedures? No  Have you had a TB test in the last year? Yes, 9/6/22 (negative)  Did you take an antihistamine today? No  Have you received any vaccinations recently? No  When was your last appointment with one of our providers? 9/13/22  When was your last injection? 9/13/22  12. Are you in a skilled nursing facility?       No    Reviewed and Confirmed by Warren Rasheed

## 2022-10-11 NOTE — PROGRESS NOTES
Subjective:      HPI:        Permanent history    Mrs. Nilesh Loja is a very pleasant 67year-old  lady with past medical history significant for seronegative rheumatoid arthritis, osteoporosis, tenosynovitis of the wrist, osteoarthritis, hypothyroidism, hyperlipidemia, fibroids who presents for evaluation of seronegative RA. Patient previously seen by rheumatology lost to follow-up I have reviewed these records. In reviewing records it appears patient has had long-standing seronegative rheumatoid arthritis, for which she has been treated with methotrexate, Plaquenil, failed Rituxan, Symphony Gwen Quirk, Actemra and most recently on Cimzia injections. Patient reports Cimzia injections were helping a however due to shingles outbreak was discontinued and was lost to follow-up. Reports pretty much only using for the past year as needed pain meds and NSAIDs for pain flareups. However the past few weeks patient has had increased pain and swelling in the right wrist, seen by PCP, treated with short-term prednisone during which symptoms improve however upon tapering symptoms return. Still persistent today. Symptoms generally worse first thing in the morning with significant stiffness up to an hour a day. Generally improved with movement with rest - Having to wear a brace on a daily basis. In terms of her osteoporosis previously treated with Actonel and has completed 5 years of Reclast last infusion being 2016 may. Since last visit   Pt was here for Cimiza injection  but has cyst/nodule on Rt side of neck, noticed it Sunday and it sore to touch. Taking vit d 50,000 weekly,Cimzia every 4 weeks, Wilmer and pred as needed,not currently. ROS:     Musculoskeletal ROS:   .    Abnormal:  joint pain, joint swelling  . AM stiffness (hours):60min  . Pain scale (0-10): 5  . Fatigue scale (0-10): 6  .    Job status:  not working  .     Activities of daily living: much difficulty    positive as above with the addition of   Dry eyes, upset stomach, red eyes, difficulty sleeping, diarrhea  Otherwise negative for:  Fevers, chills or sweats. Weight  stable  No headaches or scalp tenderness. No jaw claudication. No acute visual changes. No auditory complaints. No nasal discharge or rhinorrhea or dry mouth. No oral lesions or ulcers. No sore throat. No tongue pain. No cough. No shortness of breath, dyspnea on exertion or wheezing. No hemoptysis. No chest pains. No palpitations. No lightheadedness. No pedal edema. No GERD symptoms, abdominal pain, or constipation. No increased urinary frequency, nocturia, dysuria or changes in urine color. No alopecia or skin rashes/lesions. No changes in skin tightness. No Raynaud's. Photosensitivity. Current Outpatient Medications:     predniSONE (DELTASONE) 5 mg tablet, Take 15mg daily x3 then 10mg daily x 3 then 5mg daily x 3 then  as needed, Disp: 90 Tablet, Rfl: 0    gabapentin (NEURONTIN) 300 mg capsule, Take 1 Capsule by mouth daily (with dinner). , Disp: 90 Capsule, Rfl: 0    ergocalciferol (ERGOCALCIFEROL) 1,250 mcg (50,000 unit) capsule, Take 1 Capsule by mouth every seven (7) days. , Disp: 12 Capsule, Rfl: 0    cholecalciferol (VITAMIN D3) (2,000 UNITS /50 MCG) cap capsule, Take  by mouth daily. , Disp: , Rfl:     alendronate (FOSAMAX) 70 mg tablet, Take  by mouth every seven (7) days. , Disp: , Rfl:     hydrOXYchloroQUINE (PlaqueniL) 200 mg tablet, Take 1 Tablet by mouth daily. , Disp: 90 Tablet, Rfl: 0    rosuvastatin (CRESTOR) 10 mg tablet, Take 10 mg by mouth daily. , Disp: , Rfl:     aspirin delayed-release 81 mg tablet, Take  by mouth daily. , Disp: , Rfl:     predniSONE (DELTASONE) 5 mg tablet, Take 1 Tab by mouth daily as needed for Pain.  (Patient not taking: Reported on 10/6/2021), Disp: 90 Tab, Rfl: 0    pen needle, diabetic (COMFORT EZ PEN NEEDLES) 32 gauge x 3/16\" ndle, For use daily with the Forteo pen device, Disp: 100 Each, Rfl: 1 levothyroxine (SYNTHROID) 50 mcg tablet, Take  by mouth Daily (before breakfast). , Disp: , Rfl:     sertraline (ZOLOFT) 100 mg tablet, Take 1 Tab by mouth daily. (Patient taking differently: Take 125 mg by mouth daily.), Disp: 90 Tab, Rfl: 1    CALCIUM CARBONATE/VITAMIN D3 (CALCIUM 600 + D,3, PO), Take 1 Tab by mouth daily. Ca 600 D 100, Disp: , Rfl:     CALCIUM CARB-MAG OXIDE-ZINC OX PO, Take 1 Tab by mouth daily. Ca 333, Disp: , Rfl:     multivitamin (ONE A DAY) tablet, Take 1 Tab by mouth daily. Ca 500, D 1000, Disp: , Rfl:     folic acid 971 mcg tablet, Take 800 mcg by mouth daily. , Disp: , Rfl:     Allergies   Allergen Reactions    Egg Itching and Other (comments)     Throat swelling    Erythromycin Rash    Pcn [Penicillins] Rash    Sulfa (Sulfonamide Antibiotics) Swelling     Throat swells       Patient Active Problem List   Diagnosis Code    Fibroids D21.9    Depression F32. A    Mouth dryness R68.2    Eye dryness H04.129    Polyarthritis M13.0    Other tenosynovitis of wrist flexor M65.839    Fatigue R53.83    Hypothyroidism E03.9    Carpal tunnel syndrome G56.00    Hyperlipidemia E78.5    Hand tendonitis M77.8    Sjogren's syndrome (HCC) M35.00    Osteoporosis, post-menopausal M81.0    Trigger middle finger of left hand M65.332    Leukopenia D72.819    Edema R60.9    Primary osteoarthritis of both hands M19.041, M19.042    Rheumatoid arthritis of multiple sites without rheumatoid factor (HCC) M06.09    Chronic coughing R05.3       Past Medical History:   Diagnosis Date    Carpal tunnel syndrome 8/15/2013    Depression 8/15/2013    Eye dryness 8/15/2013    Fatigue 8/15/2013    Fibroids 8/15/2013    BILATERAL BREAST     Fibromyalgia 8/15/2013    Hyperlipidemia 8/15/2013    Hypothyroidism 8/15/2013    Menopause     Mouth dryness 8/15/2013    Osteoarthritis 8/15/2013    Other tenosynovitis of wrist flexor 8/15/2013    Polyarthritis 8/15/2013    RA (rheumatoid arthritis) ([facility] 8/15/2013       Past Surgical History:   Procedure Laterality Date    HX BREAST AUGMENTATION      HX  SECTION      HX HYSTERECTOMY      ONE OVARY REMOVED    HX MASTECTOMY Bilateral     FIBROCYSTIC MASSES REMOVED, HAS TISSUE    HX ORTHOPAEDIC      TENDON RELEASE AT RIGHT ELBOW       Family History   Problem Relation Age of Onset    Alzheimer's Disease Mother     Coronary Art Dis Mother     Bipolar Disorder Mother     Other Father         ANEURYSM    Heart Disease Father     Hypertension Father     Arthritis-rheumatoid Maternal Aunt        Social History     Socioeconomic History    Marital status:    Tobacco Use    Smoking status: Never Smoker    Smokeless tobacco: Never Used   Substance and Sexual Activity    Alcohol use: No         Objective:            Vitals:    10/11/22 1100   BP: 121/76   Pulse: 76   Weight: 123 lb (55.8 kg)         PHYSICAL EXAMINATION:     General: alert, healthy, well nourished, pleasant, no acute distress  HEENT: Right sided submandibular/cervical nodule tender to palpation less than half a centimeter. Lungs: clear to auscultation bilaterally without wheezes,    Heart: regular rate & rhythm, +S1, +S2, no murmurs   Extremities: no clubbing, cyanosis, or edema    MUSCULOSKELETAL:   -Hands: Diffuse fullness chronic thickening bilateral MCP second third fourth, no acute synovitis    -Wrists: Right 10 minimally tender to palpation left significantly tender to palpation decreased pincer   -Elbows: normal   -Shoulders: normal   -Neck: Slow but full range of motion due to pain  -Back: Point tenderness of the low back  -Hips: normal   -Knees: Bilateral crepitus  -Ankles: normal   -Feet: normal    Swollen Joint Count: 0  Tender Joint Count: 3        Assessment:       Mrs. Rylee Easley is a very pleasant 77-year-old  lady with past medical history significant for seronegative rheumatoid arthritis, osteoporosis, tenosynovitis of the wrist, osteoarthritis, hypothyroidism, hyperlipidemia, fibroids who presents for fu of seronegative RA. Extensive review of records from old rheumatology please see summary above, I do not have patient's PCP records available at this time will obtain. Recent xrays which I have reviewed myself show joint space narrowing and some new early erosive changes     Clinical picture consistent with long-standing seronegative rheumatoid arthritis previously failed methotrexate, Plaquenil, rituximab, Simphoni Aria, Nessa Stai, Actemra and most recently on Cimzia which although effective is held due to recurrent shingles outbreak -was on hold with immunosuppressives except low dose plaquenil subsequently flairing significantly and finally able to get shingrex vaccines and resume Cimzia last visit, has been getting monthly injections tolerating well no flaring of shingles. Labs stable -ophthalmology recommend discontinuing Plaquenil  stopped 2021. Recently had cataracts removed right eye pending in few weeks. In terms of RA fairly stable continue no flaring of her shingles. Patient here for routine infusion however noted to have right neck mass since Sunday, tender to palpation, muscular versus lymphadenopathy versus vascular will get ultrasound. Okay to proceed with infusion otherwise    Osteoporosis -previously treated with Actonel and 5 years of Reclast last dose 2016 May. Repeat dexa scan from 2021 -    FRAX calculation (using patients risk factors ) of 10 yr risk of major osteoporotic and hip fracture is 30% and 9 % (from 48  and 18 % respectively - after completing 2 yrs of forteo) and Tx warranted if >20% and 3 % respectively - change to prolia - every 6 months - tolerating . Radiculopathy -on gabapentin as below. Total visit time 45 minutes , greater than half of the time was spent on counseling. Plan:       1. Labs - cbc, cmp, vit d before next visit    2. Prednisone  5mg daily   as needed   3.  Vit d 73643 units change to every 2 weekly for now   4.  prolia - every 6 months   5. Gabapentin 300mg once daily at 6pm  6.  cimzia - every 4 weeks shot - continue   7.  R neck US TODAY _ STAT - acute painful nodule - muscular vs LA vs vascular   8.   RTC in  4 months   - call if any issues

## 2022-10-11 NOTE — PATIENT INSTRUCTIONS
1. Labs - cbc, cmp, vit d before next visit    2. Prednisone  5mg daily   as needed   3. Vit d 92292 units change to every 2 weekly for now   4.  prolia - every 6 months   5. Gabapentin 300mg once daily at 6pm  6.  cimzia - every 4 weeks shot - continue   7.  R neck US TODAY _ STAT - acute painful nodule - muscular vs LA vs vascular   8.   RTC in  4 months   - call if any issues

## 2022-11-08 ENCOUNTER — NURSE ONLY (OUTPATIENT)
Dept: RHEUMATOLOGY | Age: 72
End: 2022-11-08
Payer: MEDICARE

## 2022-11-08 VITALS
SYSTOLIC BLOOD PRESSURE: 101 MMHG | DIASTOLIC BLOOD PRESSURE: 66 MMHG | HEART RATE: 90 BPM | WEIGHT: 123 LBS | TEMPERATURE: 97.6 F

## 2022-11-08 DIAGNOSIS — M06.09 RHEUMATOID ARTHRITIS OF MULTIPLE SITES WITHOUT RHEUMATOID FACTOR (HCC): Primary | ICD-10-CM

## 2022-11-08 PROCEDURE — 96372 THER/PROPH/DIAG INJ SC/IM: CPT | Performed by: INTERNAL MEDICINE

## 2022-11-08 NOTE — PROGRESS NOTES
[address] [address], [address]  Office : [phone], Fax: [phone]         Cimzia injections today. 400  mg lyophilized powder given SC. Each 200 mg vial mixed with 1 ml sterile water each and allowed 30 minutes to reconstitute. Injections given in abdomen right and left lower without difficulty. Last injections were given 10/11/2022  Next Injections scheduled for 12/6/2022    Pre-Infusion Questionnaire  Has your insurance changed or have you received a new card since your last visit? No  Have you had an infection since your last infusion? No  Have you recently had GI problems, open wounds, urinary problems, or chest pain? No  Are you currently taking antibiotics? No  Have you recently had or are you scheduled for any surgical procedures? No  Have you had a TB test in the last year? Yes  Did you take an antihistamine today? No  Have you received any vaccinations recently? No  When was your last appointment with one of our providers? 9/13/2022  When was your last injection? 10/11/2022  12. Are you in a skilled nursing facility?       No    Reviewed and Confirmed by Warren Rasheed

## 2022-12-06 ENCOUNTER — NURSE ONLY (OUTPATIENT)
Dept: RHEUMATOLOGY | Age: 72
End: 2022-12-06
Payer: MEDICARE

## 2022-12-06 VITALS
DIASTOLIC BLOOD PRESSURE: 70 MMHG | TEMPERATURE: 97.4 F | SYSTOLIC BLOOD PRESSURE: 131 MMHG | HEART RATE: 78 BPM | WEIGHT: 124 LBS

## 2022-12-06 DIAGNOSIS — M06.09 RHEUMATOID ARTHRITIS OF MULTIPLE SITES WITHOUT RHEUMATOID FACTOR (HCC): Primary | ICD-10-CM

## 2022-12-06 PROCEDURE — 96372 THER/PROPH/DIAG INJ SC/IM: CPT | Performed by: INTERNAL MEDICINE

## 2022-12-06 NOTE — PROGRESS NOTES
[address] [address], [address]  Office : [phone], Fax: [phone]         Cimzia injections today. 400  mg lyophilized powder given SC. Each 200 mg vial mixed with 1 ml sterile water each and allowed 30 minutes to reconstitute. Injections given in right and left lower abdomen without difficulty. Last injections were given 11/8/2022  Next Injections scheduled for 1/3/2023    Pre-Infusion Questionnaire  Has your insurance changed or have you received a new card since your last visit? No  Have you had an infection since your last infusion? No  Have you recently had GI problems, open wounds, urinary problems, or chest pain? No  Are you currently taking antibiotics? No  Have you recently had or are you scheduled for any surgical procedures? No  Have you had a TB test in the last year? Yes  Did you take an antihistamine today? No  Have you received any vaccinations recently? Yes  When was your last appointment with one of our providers? 10/11/2022  When was your last injection? 11/8/2022  12. Are you in a skilled nursing facility?       No    Reviewed and Confirmed by Lavern Begum

## 2023-01-03 ENCOUNTER — NURSE ONLY (OUTPATIENT)
Dept: RHEUMATOLOGY | Age: 73
End: 2023-01-03
Payer: MEDICARE

## 2023-01-03 VITALS
WEIGHT: 124 LBS | TEMPERATURE: 97.7 F | SYSTOLIC BLOOD PRESSURE: 129 MMHG | DIASTOLIC BLOOD PRESSURE: 74 MMHG | HEART RATE: 92 BPM

## 2023-01-03 DIAGNOSIS — M06.09 RHEUMATOID ARTHRITIS OF MULTIPLE SITES WITHOUT RHEUMATOID FACTOR (HCC): Primary | ICD-10-CM

## 2023-01-03 PROCEDURE — 96372 THER/PROPH/DIAG INJ SC/IM: CPT | Performed by: INTERNAL MEDICINE

## 2023-01-03 NOTE — PROGRESS NOTES
David [address], [address] [facility], [facility] [address]   Office : [phone], Fax: [phone]         Cimzia injections today. 400  mg lyophilized powder given SC. Each 200 mg vial mixed with 1 ml sterile water each and allowed 30 minutes to reconstitute. Injections given in abdomen  without difficulty. Last injections were given 12/6/2022  Next Injections scheduled for 1/31/2023    Pre-Infusion Questionnaire  Has your insurance changed or have you received a new card since your last visit? No  Have you had an infection since your last infusion? No  Have you recently had GI problems, open wounds, urinary problems, or chest pain? No  Are you currently taking antibiotics? No  Have you recently had or are you scheduled for any surgical procedures? No  Have you had a TB test in the last year? Yes  Did you take an antihistamine today? No  Have you received any vaccinations recently? Yes  When was your last appointment with one of our providers? 10/11/2022  When was your last injection? 12/6/2022  12. Are you in a skilled nursing facility?       No    Reviewed and Confirmed by Wenceslao Barr

## 2023-01-25 DIAGNOSIS — E55.9 HYPOVITAMINOSIS D: ICD-10-CM

## 2023-01-25 DIAGNOSIS — Z79.83 LONG TERM (CURRENT) USE OF BISPHOSPHONATES: ICD-10-CM

## 2023-01-25 DIAGNOSIS — Z79.52 LONG TERM CURRENT USE OF SYSTEMIC STEROIDS: ICD-10-CM

## 2023-01-25 DIAGNOSIS — Z51.81 ENCOUNTER FOR THERAPEUTIC DRUG MONITORING: ICD-10-CM

## 2023-01-25 DIAGNOSIS — M81.0 POSTMENOPAUSAL OSTEOPOROSIS: ICD-10-CM

## 2023-01-25 DIAGNOSIS — M35.01 SJOGREN'S SYNDROME WITH KERATOCONJUNCTIVITIS SICCA (HCC): ICD-10-CM

## 2023-01-25 DIAGNOSIS — Z79.899 HIGH RISK MEDICATION USE: ICD-10-CM

## 2023-01-25 DIAGNOSIS — M06.09 RHEUMATOID ARTHRITIS OF MULTIPLE SITES WITHOUT RHEUMATOID FACTOR (HCC): ICD-10-CM

## 2023-01-25 DIAGNOSIS — Z79.899 LONG TERM CURRENT USE OF IMMUNOSUPPRESSIVE DRUG: ICD-10-CM

## 2023-01-25 DIAGNOSIS — Z79.52 LONG TERM (CURRENT) USE OF SYSTEMIC STEROIDS: ICD-10-CM

## 2023-01-25 LAB
25(OH)D3 SERPL-MCNC: 46 NG/ML (ref 30–100)
ALBUMIN SERPL-MCNC: 3.8 G/DL (ref 3.2–4.6)
ALBUMIN/GLOB SERPL: 1 (ref 0.4–1.6)
ALP SERPL-CCNC: 45 U/L (ref 50–136)
ALT SERPL-CCNC: 26 U/L (ref 12–65)
ANION GAP SERPL CALC-SCNC: 8 MMOL/L (ref 2–11)
AST SERPL-CCNC: 23 U/L (ref 15–37)
BILIRUB SERPL-MCNC: 1 MG/DL (ref 0.2–1.1)
BUN SERPL-MCNC: 7 MG/DL (ref 8–23)
CALCIUM SERPL-MCNC: 8.8 MG/DL (ref 8.3–10.4)
CHLORIDE SERPL-SCNC: 104 MMOL/L (ref 101–110)
CO2 SERPL-SCNC: 26 MMOL/L (ref 21–32)
CREAT SERPL-MCNC: 0.7 MG/DL (ref 0.6–1)
ERYTHROCYTE [DISTWIDTH] IN BLOOD BY AUTOMATED COUNT: 12.6 % (ref 11.9–14.6)
GLOBULIN SER CALC-MCNC: 3.8 G/DL (ref 2.8–4.5)
GLUCOSE SERPL-MCNC: 85 MG/DL (ref 65–100)
HCT VFR BLD AUTO: 40.4 % (ref 35.8–46.3)
HGB BLD-MCNC: 13.8 G/DL (ref 11.7–15.4)
MCH RBC QN AUTO: 32.2 PG (ref 26.1–32.9)
MCHC RBC AUTO-ENTMCNC: 34.2 G/DL (ref 31.4–35)
MCV RBC AUTO: 94.4 FL (ref 82–102)
NRBC # BLD: 0 K/UL (ref 0–0.2)
PLATELET # BLD AUTO: 153 K/UL (ref 150–450)
PMV BLD AUTO: 9.9 FL (ref 9.4–12.3)
POTASSIUM SERPL-SCNC: 3.9 MMOL/L (ref 3.5–5.1)
PROT SERPL-MCNC: 7.6 G/DL (ref 6.3–8.2)
RBC # BLD AUTO: 4.28 M/UL (ref 4.05–5.2)
SODIUM SERPL-SCNC: 138 MMOL/L (ref 133–143)
WBC # BLD AUTO: 5.7 K/UL (ref 4.3–11.1)

## 2023-01-30 LAB
M TB IFN-G BLD-IMP: NEGATIVE
M TB IFN-G CD4+ T-CELLS BLD-ACNC: 0.05 IU/ML
M TBIFN-G CD4+ CD8+T-CELLS BLD-ACNC: 0.07 IU/ML
QUANTIFERON CRITERIA: NORMAL
QUANTIFERON MITOGEN VALUE: 3.73 IU/ML
QUANTIFERON NIL VALUE: 0.03 IU/ML
QUANTIFERON, INCUBATION: NORMAL

## 2023-01-31 ENCOUNTER — TELEPHONE (OUTPATIENT)
Dept: RHEUMATOLOGY | Age: 73
End: 2023-01-31

## 2023-02-01 NOTE — TELEPHONE ENCOUNTER
Patient due for Prolia 1/22/23. She was scheduled with Dr. Sarah Acuña 1/31/23, but the appt was cancelled due to provider out of the office. Labs have been done 1/25/23. BMD 5/6/2021. Patient was scheduled to have Prolia at the [address] [place] [address] Rd 1/31/23.    Lab Results   Component Value Date     01/25/2023    K 3.9 01/25/2023     01/25/2023    CO2 26 01/25/2023    BUN 7 (L) 01/25/2023    CREATININE 0.70 01/25/2023    GLUCOSE 85 01/25/2023    CALCIUM 8.8 01/25/2023    PROT 7.6 01/25/2023    LABALBU 3.8 01/25/2023    BILITOT 1.0 01/25/2023    ALKPHOS 45 (L) 01/25/2023    AST 23 01/25/2023    ALT 26 01/25/2023    LABGLOM >60 01/25/2023    GFRAA >60 09/06/2022    AGRATIO 1.6 05/10/2022    GLOB 3.8 01/25/2023       Lab Results   Component Value Date/Time    VITD25 46.0 01/25/2023 10:06 AM

## 2023-02-07 NOTE — TELEPHONE ENCOUNTER
Spoke with patient 2/3/23. She is frustrated that she was not scheduled for Cimzia and her OV was cancelled. She is going out of town on Monday 2/6/23 for the week. Told her I could schedule her first thing on Monday morning before she left town, but she can't make it work. Contemplating leaving the practice. Will let me know when she is back in town and can schedule the Cimzia and Prolia.  Labs were done 1/25/23

## 2023-02-16 ENCOUNTER — NURSE ONLY (OUTPATIENT)
Dept: RHEUMATOLOGY | Age: 73
End: 2023-02-16
Payer: MEDICARE

## 2023-02-16 VITALS
WEIGHT: 123 LBS | DIASTOLIC BLOOD PRESSURE: 71 MMHG | HEART RATE: 105 BPM | TEMPERATURE: 97.5 F | SYSTOLIC BLOOD PRESSURE: 117 MMHG

## 2023-02-16 DIAGNOSIS — M06.09 RHEUMATOID ARTHRITIS OF MULTIPLE SITES WITHOUT RHEUMATOID FACTOR (HCC): Primary | ICD-10-CM

## 2023-02-16 PROCEDURE — 96372 THER/PROPH/DIAG INJ SC/IM: CPT | Performed by: INTERNAL MEDICINE

## 2023-02-16 NOTE — PROGRESS NOTES
David Helm, [address]ree [address], [address]  Office : [phone], Fax: [phone]         Cimzia injections today. 400  mg lyophilized powder given SC. Each 200 mg vial mixed with 1 ml sterile water each and allowed 30 minutes to reconstitute. Injections given in right and left abdomen without difficulty. Last injections were given 1/3/2023  Next Injections scheduled for 3/16/2023    Pre-Infusion Questionnaire  Has your insurance changed or have you received a new card since your last visit? No  Have you had an infection since your last infusion? No  Have you recently had GI problems, open wounds, urinary problems, or chest pain? No  Are you currently taking antibiotics? No  Have you recently had or are you scheduled for any surgical procedures? No  Have you had a TB test in the last year? Yes  Did you take an antihistamine today? No  Have you received any vaccinations recently? No  When was your last appointment with one of our providers? [phone]  When was your last injection? 1/3/2023  12. Are you in a skilled nursing facility?       No    Reviewed and Confirmed by Luis Wolff

## 2023-02-20 ENCOUNTER — NURSE ONLY (OUTPATIENT)
Dept: RHEUMATOLOGY | Age: 73
End: 2023-02-20
Payer: MEDICARE

## 2023-02-20 VITALS — DIASTOLIC BLOOD PRESSURE: 73 MMHG | SYSTOLIC BLOOD PRESSURE: 133 MMHG | TEMPERATURE: 98.1 F | HEART RATE: 78 BPM

## 2023-02-20 DIAGNOSIS — M81.0 POSTMENOPAUSAL OSTEOPOROSIS: Primary | ICD-10-CM

## 2023-02-20 PROCEDURE — 96372 THER/PROPH/DIAG INJ SC/IM: CPT | Performed by: INTERNAL MEDICINE

## 2023-03-16 ENCOUNTER — NURSE ONLY (OUTPATIENT)
Dept: RHEUMATOLOGY | Age: 73
End: 2023-03-16

## 2023-03-16 VITALS — SYSTOLIC BLOOD PRESSURE: 121 MMHG | TEMPERATURE: 97.7 F | DIASTOLIC BLOOD PRESSURE: 75 MMHG | HEART RATE: 87 BPM

## 2023-03-16 DIAGNOSIS — M05.9 SEROPOSITIVE RHEUMATOID ARTHRITIS (HCC): ICD-10-CM

## 2023-03-16 DIAGNOSIS — M81.0 OSTEOPOROSIS, POST-MENOPAUSAL: Primary | ICD-10-CM

## 2023-03-16 NOTE — PROGRESS NOTES
David [address], [address], [address]  Office : [phone], Fax: [phone]         Cimzia injections today. 400 mg lyophilized powder given SC. Each 200 mg vial mixed with 1 ml sterile water each and allowed 30 minutes to reconstitute. Injections given in bilateral lower abdomen without difficulty. Last injections were given 2/16/23  Next Injections scheduled for 4/13/23    Pre-Infusion Questionnaire  Has your insurance changed or have you received a new card since your last visit? No  Have you had an infection since your last infusion? No  Have you recently had GI problems, open wounds, urinary problems, or chest pain? No  Are you currently taking antibiotics? No  Have you recently had or are you scheduled for any surgical procedures? No  Have you had a TB test in the last year? Yes  Did you take an antihistamine today? No  Have you received any vaccinations recently? No  When was your last appointment with one of our providers? 10/11/22  When was your last injection? 2/16/23  12. Are you in a skilled nursing facility?       No    Reviewed and Confirmed by Rj Herzog

## 2023-04-10 DIAGNOSIS — Z79.83 LONG TERM (CURRENT) USE OF BISPHOSPHONATES: ICD-10-CM

## 2023-04-10 DIAGNOSIS — Z79.899 HIGH RISK MEDICATION USE: ICD-10-CM

## 2023-04-10 DIAGNOSIS — Z79.52 LONG TERM CURRENT USE OF SYSTEMIC STEROIDS: ICD-10-CM

## 2023-04-10 DIAGNOSIS — Z79.52 LONG TERM (CURRENT) USE OF SYSTEMIC STEROIDS: ICD-10-CM

## 2023-04-10 DIAGNOSIS — M06.09 RHEUMATOID ARTHRITIS OF MULTIPLE SITES WITHOUT RHEUMATOID FACTOR (HCC): ICD-10-CM

## 2023-04-10 DIAGNOSIS — E55.9 HYPOVITAMINOSIS D: ICD-10-CM

## 2023-04-10 DIAGNOSIS — Z51.81 ENCOUNTER FOR THERAPEUTIC DRUG MONITORING: ICD-10-CM

## 2023-04-10 DIAGNOSIS — Z79.899 LONG TERM CURRENT USE OF IMMUNOSUPPRESSIVE DRUG: ICD-10-CM

## 2023-04-10 DIAGNOSIS — M35.01 SJOGREN'S SYNDROME WITH KERATOCONJUNCTIVITIS SICCA (HCC): ICD-10-CM

## 2023-04-10 DIAGNOSIS — M81.0 POSTMENOPAUSAL OSTEOPOROSIS: ICD-10-CM

## 2023-04-10 LAB
25(OH)D3 SERPL-MCNC: 46 NG/ML (ref 30–100)
ALBUMIN SERPL-MCNC: 4 G/DL (ref 3.2–4.6)
ALBUMIN/GLOB SERPL: 1.1 (ref 0.4–1.6)
ALP SERPL-CCNC: 44 U/L (ref 50–136)
ALT SERPL-CCNC: 32 U/L (ref 12–65)
ANION GAP SERPL CALC-SCNC: 6 MMOL/L (ref 2–11)
AST SERPL-CCNC: 29 U/L (ref 15–37)
BILIRUB SERPL-MCNC: 0.8 MG/DL (ref 0.2–1.1)
BUN SERPL-MCNC: 10 MG/DL (ref 8–23)
CALCIUM SERPL-MCNC: 9.4 MG/DL (ref 8.3–10.4)
CHLORIDE SERPL-SCNC: 107 MMOL/L (ref 101–110)
CO2 SERPL-SCNC: 27 MMOL/L (ref 21–32)
CREAT SERPL-MCNC: 0.8 MG/DL (ref 0.6–1)
ERYTHROCYTE [DISTWIDTH] IN BLOOD BY AUTOMATED COUNT: 12.1 % (ref 11.9–14.6)
GLOBULIN SER CALC-MCNC: 3.6 G/DL (ref 2.8–4.5)
GLUCOSE SERPL-MCNC: 79 MG/DL (ref 65–100)
HCT VFR BLD AUTO: 42.6 % (ref 35.8–46.3)
HGB BLD-MCNC: 14.3 G/DL (ref 11.7–15.4)
MCH RBC QN AUTO: 32.9 PG (ref 26.1–32.9)
MCHC RBC AUTO-ENTMCNC: 33.6 G/DL (ref 31.4–35)
MCV RBC AUTO: 97.9 FL (ref 82–102)
NRBC # BLD: 0 K/UL (ref 0–0.2)
PLATELET # BLD AUTO: 141 K/UL (ref 150–450)
PMV BLD AUTO: 9.5 FL (ref 9.4–12.3)
POTASSIUM SERPL-SCNC: 4 MMOL/L (ref 3.5–5.1)
PROT SERPL-MCNC: 7.6 G/DL (ref 6.3–8.2)
RBC # BLD AUTO: 4.35 M/UL (ref 4.05–5.2)
SODIUM SERPL-SCNC: 140 MMOL/L (ref 133–143)
WBC # BLD AUTO: 5.2 K/UL (ref 4.3–11.1)

## 2023-04-13 ENCOUNTER — TELEMEDICINE (OUTPATIENT)
Dept: RHEUMATOLOGY | Age: 73
End: 2023-04-13
Payer: MEDICARE

## 2023-04-13 DIAGNOSIS — Z79.899 HIGH RISK MEDICATION USE: ICD-10-CM

## 2023-04-13 DIAGNOSIS — Z79.83 LONG TERM (CURRENT) USE OF BISPHOSPHONATES: ICD-10-CM

## 2023-04-13 DIAGNOSIS — M81.0 POSTMENOPAUSAL OSTEOPOROSIS: ICD-10-CM

## 2023-04-13 DIAGNOSIS — M35.01 SJOGREN'S SYNDROME WITH KERATOCONJUNCTIVITIS SICCA (HCC): ICD-10-CM

## 2023-04-13 DIAGNOSIS — Z51.81 ENCOUNTER FOR THERAPEUTIC DRUG MONITORING: ICD-10-CM

## 2023-04-13 DIAGNOSIS — Z79.52 LONG TERM CURRENT USE OF SYSTEMIC STEROIDS: ICD-10-CM

## 2023-04-13 DIAGNOSIS — M06.09 RHEUMATOID ARTHRITIS OF MULTIPLE SITES WITHOUT RHEUMATOID FACTOR (HCC): ICD-10-CM

## 2023-04-13 DIAGNOSIS — Z79.899 LONG TERM CURRENT USE OF IMMUNOSUPPRESSIVE DRUG: ICD-10-CM

## 2023-04-13 DIAGNOSIS — E55.9 HYPOVITAMINOSIS D: ICD-10-CM

## 2023-04-13 DIAGNOSIS — Z79.52 LONG TERM (CURRENT) USE OF SYSTEMIC STEROIDS: ICD-10-CM

## 2023-04-13 PROCEDURE — G8399 PT W/DXA RESULTS DOCUMENT: HCPCS | Performed by: INTERNAL MEDICINE

## 2023-04-13 PROCEDURE — 99215 OFFICE O/P EST HI 40 MIN: CPT | Performed by: INTERNAL MEDICINE

## 2023-04-13 PROCEDURE — 3017F COLORECTAL CA SCREEN DOC REV: CPT | Performed by: INTERNAL MEDICINE

## 2023-04-13 PROCEDURE — 1123F ACP DISCUSS/DSCN MKR DOCD: CPT | Performed by: INTERNAL MEDICINE

## 2023-04-13 PROCEDURE — G8427 DOCREV CUR MEDS BY ELIG CLIN: HCPCS | Performed by: INTERNAL MEDICINE

## 2023-04-13 PROCEDURE — 1090F PRES/ABSN URINE INCON ASSESS: CPT | Performed by: INTERNAL MEDICINE

## 2023-04-14 RX ORDER — GABAPENTIN 300 MG/1
CAPSULE ORAL
Qty: 90 CAPSULE | Refills: 1 | Status: SHIPPED | OUTPATIENT
Start: 2023-04-14 | End: 2024-04-18

## 2023-04-14 RX ORDER — ERGOCALCIFEROL 1.25 MG/1
50000 CAPSULE ORAL
Qty: 6 CAPSULE | Refills: 2 | Status: SHIPPED | OUTPATIENT
Start: 2023-04-14

## 2023-05-11 ENCOUNTER — NURSE ONLY (OUTPATIENT)
Dept: RHEUMATOLOGY | Age: 73
End: 2023-05-11
Payer: MEDICARE

## 2023-05-11 VITALS
WEIGHT: 123 LBS | HEART RATE: 87 BPM | TEMPERATURE: 97.5 F | DIASTOLIC BLOOD PRESSURE: 77 MMHG | SYSTOLIC BLOOD PRESSURE: 133 MMHG

## 2023-05-11 DIAGNOSIS — M06.09 RHEUMATOID ARTHRITIS OF MULTIPLE SITES WITHOUT RHEUMATOID FACTOR (HCC): Primary | ICD-10-CM

## 2023-05-11 PROCEDURE — 96372 THER/PROPH/DIAG INJ SC/IM: CPT | Performed by: INTERNAL MEDICINE

## 2023-05-11 NOTE — PROGRESS NOTES
David [address], Mary[address], [address]  Office : [phone], Fax: [phone]         Cimzia injections today. 400  mg lyophilized powder given SC. Each 200 mg vial mixed with 1 ml sterile water each and allowed 30 minutes to reconstitute. Injections given in right and left lower abdomen  without difficulty. Last injections were given 4/13/2023  Pt not returning to this office for dr care of treatment. Pre-Infusion Questionnaire  Has your insurance changed or have you received a new card since your last visit? No  Have you had an infection since your last infusion? No  Have you recently had GI problems, open wounds, urinary problems, or chest pain? No  Are you currently taking antibiotics? No  Have you recently had or are you scheduled for any surgical procedures? No  Have you had a TB test in the last year? Yes     1/25/2023  Did you take an antihistamine today? No  Have you received any vaccinations recently? No  When was your last appointment with one of our providers? 4/13/2023  When was your last injection? 4/13/2023  12. Are you in a skilled nursing facility?       No    Reviewed and Confirmed by Rosalba Singletary

## 2023-05-31 ENCOUNTER — HOSPITAL ENCOUNTER (OUTPATIENT)
Dept: MAMMOGRAPHY | Age: 73
Discharge: HOME | End: 2023-06-03
Payer: MEDICARE

## 2023-05-31 DIAGNOSIS — R92.1 CALCIFICATION OF RIGHT BREAST: ICD-10-CM

## 2023-05-31 DIAGNOSIS — Z12.31 ENCOUNTER FOR SCREENING MAMMOGRAM FOR MALIGNANT NEOPLASM OF BREAST: ICD-10-CM

## 2023-05-31 DIAGNOSIS — Z78.0 POSTMENOPAUSAL: ICD-10-CM

## 2023-05-31 PROCEDURE — 77066 DX MAMMO INCL CAD BI: CPT

## 2023-05-31 PROCEDURE — 77080 DXA BONE DENSITY AXIAL: CPT

## 2024-09-07 ENCOUNTER — HOSPITAL ENCOUNTER (OUTPATIENT)
Dept: MAMMOGRAPHY | Age: 74
Discharge: HOME | End: 2024-09-10
Payer: MEDICARE

## 2024-09-07 VITALS — HEIGHT: 60 IN | BODY MASS INDEX: 23.56 KG/M2 | WEIGHT: 120 LBS

## 2024-09-07 DIAGNOSIS — Z12.31 ENCOUNTER FOR SCREENING MAMMOGRAM FOR BREAST CANCER: ICD-10-CM

## 2024-09-07 PROCEDURE — 77067 SCR MAMMO BI INCL CAD: CPT
